# Patient Record
Sex: MALE | Race: BLACK OR AFRICAN AMERICAN | NOT HISPANIC OR LATINO | Employment: OTHER | URBAN - METROPOLITAN AREA
[De-identification: names, ages, dates, MRNs, and addresses within clinical notes are randomized per-mention and may not be internally consistent; named-entity substitution may affect disease eponyms.]

---

## 2022-09-07 RX ORDER — ATORVASTATIN CALCIUM 40 MG/1
40 TABLET, FILM COATED ORAL NIGHTLY
COMMUNITY
End: 2023-09-20 | Stop reason: ALTCHOICE

## 2022-09-07 RX ORDER — ISOSORBIDE DINITRATE 20 MG/1
20 TABLET ORAL 2 TIMES DAILY
COMMUNITY

## 2022-09-07 RX ORDER — GABAPENTIN 100 MG/1
200 CAPSULE ORAL 2 TIMES DAILY
COMMUNITY

## 2022-09-07 RX ORDER — AMLODIPINE BESYLATE 5 MG/1
5 TABLET ORAL DAILY
COMMUNITY
End: 2023-03-20 | Stop reason: ALTCHOICE

## 2022-09-07 RX ORDER — TAMSULOSIN HYDROCHLORIDE 0.4 MG/1
CAPSULE ORAL DAILY
COMMUNITY
End: 2022-09-19 | Stop reason: ALTCHOICE

## 2022-09-07 RX ORDER — OMEPRAZOLE 20 MG/1
20 CAPSULE, DELAYED RELEASE ORAL DAILY
COMMUNITY

## 2022-09-07 RX ORDER — CARVEDILOL 3.12 MG/1
3.12 TABLET ORAL 2 TIMES DAILY WITH MEALS
COMMUNITY

## 2022-09-07 RX ORDER — FINASTERIDE 5 MG/1
5 TABLET, FILM COATED ORAL DAILY
COMMUNITY

## 2022-09-07 RX ORDER — SERTRALINE HYDROCHLORIDE 50 MG/1
50 TABLET, FILM COATED ORAL NIGHTLY
COMMUNITY

## 2022-09-07 RX ORDER — FEBUXOSTAT 40 MG/1
40 TABLET, FILM COATED ORAL DAILY
COMMUNITY

## 2022-09-19 ENCOUNTER — OFFICE VISIT (OUTPATIENT)
Dept: NEPHROLOGY | Facility: CLINIC | Age: 77
End: 2022-09-19
Payer: MEDICARE

## 2022-09-19 VITALS
BODY MASS INDEX: 33.9 KG/M2 | DIASTOLIC BLOOD PRESSURE: 66 MMHG | WEIGHT: 216 LBS | HEART RATE: 58 BPM | HEIGHT: 67 IN | OXYGEN SATURATION: 96 % | SYSTOLIC BLOOD PRESSURE: 128 MMHG

## 2022-09-19 DIAGNOSIS — N18.9 ANEMIA IN CHRONIC KIDNEY DISEASE, UNSPECIFIED CKD STAGE: ICD-10-CM

## 2022-09-19 DIAGNOSIS — N18.31 TYPE 2 DIABETES MELLITUS WITH STAGE 3A CHRONIC KIDNEY DISEASE, WITHOUT LONG-TERM CURRENT USE OF INSULIN: ICD-10-CM

## 2022-09-19 DIAGNOSIS — D63.1 ANEMIA IN CHRONIC KIDNEY DISEASE, UNSPECIFIED CKD STAGE: ICD-10-CM

## 2022-09-19 DIAGNOSIS — E11.21 DIABETIC NEPHROPATHY ASSOCIATED WITH TYPE 2 DIABETES MELLITUS: ICD-10-CM

## 2022-09-19 DIAGNOSIS — E11.22 TYPE 2 DIABETES MELLITUS WITH STAGE 3A CHRONIC KIDNEY DISEASE, WITHOUT LONG-TERM CURRENT USE OF INSULIN: ICD-10-CM

## 2022-09-19 DIAGNOSIS — R80.9 NON-NEPHROTIC RANGE PROTEINURIA: ICD-10-CM

## 2022-09-19 DIAGNOSIS — I12.9 RENAL HYPERTENSION: ICD-10-CM

## 2022-09-19 DIAGNOSIS — M1A.0710 IDIOPATHIC CHRONIC GOUT OF RIGHT FOOT WITHOUT TOPHUS: ICD-10-CM

## 2022-09-19 DIAGNOSIS — N18.31 STAGE 3A CHRONIC KIDNEY DISEASE: Primary | ICD-10-CM

## 2022-09-19 PROBLEM — E11.29 TYPE 2 DIABETES MELLITUS WITH KIDNEY COMPLICATION, WITHOUT LONG-TERM CURRENT USE OF INSULIN: Status: ACTIVE | Noted: 2022-09-19

## 2022-09-19 PROCEDURE — 4010F PR ACE/ARB THEARPY RXD/TAKEN: ICD-10-PCS | Mod: CPTII,,, | Performed by: INTERNAL MEDICINE

## 2022-09-19 PROCEDURE — 99214 PR OFFICE/OUTPT VISIT, EST, LEVL IV, 30-39 MIN: ICD-10-PCS | Mod: ,,, | Performed by: INTERNAL MEDICINE

## 2022-09-19 PROCEDURE — 3074F PR MOST RECENT SYSTOLIC BLOOD PRESSURE < 130 MM HG: ICD-10-PCS | Mod: CPTII,,, | Performed by: INTERNAL MEDICINE

## 2022-09-19 PROCEDURE — 1159F MED LIST DOCD IN RCRD: CPT | Mod: CPTII,,, | Performed by: INTERNAL MEDICINE

## 2022-09-19 PROCEDURE — 3288F PR FALLS RISK ASSESSMENT DOCUMENTED: ICD-10-PCS | Mod: CPTII,,, | Performed by: INTERNAL MEDICINE

## 2022-09-19 PROCEDURE — 3066F NEPHROPATHY DOC TX: CPT | Mod: CPTII,,, | Performed by: INTERNAL MEDICINE

## 2022-09-19 PROCEDURE — 3074F SYST BP LT 130 MM HG: CPT | Mod: CPTII,,, | Performed by: INTERNAL MEDICINE

## 2022-09-19 PROCEDURE — 3066F PR DOCUMENTATION OF TREATMENT FOR NEPHROPATHY: ICD-10-PCS | Mod: CPTII,,, | Performed by: INTERNAL MEDICINE

## 2022-09-19 PROCEDURE — 1101F PT FALLS ASSESS-DOCD LE1/YR: CPT | Mod: CPTII,,, | Performed by: INTERNAL MEDICINE

## 2022-09-19 PROCEDURE — 3078F PR MOST RECENT DIASTOLIC BLOOD PRESSURE < 80 MM HG: ICD-10-PCS | Mod: CPTII,,, | Performed by: INTERNAL MEDICINE

## 2022-09-19 PROCEDURE — 1101F PR PT FALLS ASSESS DOC 0-1 FALLS W/OUT INJ PAST YR: ICD-10-PCS | Mod: CPTII,,, | Performed by: INTERNAL MEDICINE

## 2022-09-19 PROCEDURE — 1159F PR MEDICATION LIST DOCUMENTED IN MEDICAL RECORD: ICD-10-PCS | Mod: CPTII,,, | Performed by: INTERNAL MEDICINE

## 2022-09-19 PROCEDURE — 99214 OFFICE O/P EST MOD 30 MIN: CPT | Mod: ,,, | Performed by: INTERNAL MEDICINE

## 2022-09-19 PROCEDURE — 3078F DIAST BP <80 MM HG: CPT | Mod: CPTII,,, | Performed by: INTERNAL MEDICINE

## 2022-09-19 PROCEDURE — 3288F FALL RISK ASSESSMENT DOCD: CPT | Mod: CPTII,,, | Performed by: INTERNAL MEDICINE

## 2022-09-19 PROCEDURE — 4010F ACE/ARB THERAPY RXD/TAKEN: CPT | Mod: CPTII,,, | Performed by: INTERNAL MEDICINE

## 2022-09-19 RX ORDER — SEMAGLUTIDE 1.34 MG/ML
INJECTION, SOLUTION SUBCUTANEOUS
COMMUNITY

## 2022-09-19 RX ORDER — ACETAMINOPHEN 500 MG
TABLET ORAL 2 TIMES DAILY
COMMUNITY

## 2022-09-19 RX ORDER — ASPIRIN 81 MG/1
81 TABLET ORAL DAILY
COMMUNITY
Start: 2022-05-08

## 2022-09-19 NOTE — PATIENT INSTRUCTIONS
The patient has been provided with their current level of kidney function including eGFR and creatinine, and he is asked to return for follow-up, this time, in 6 months with repeat kidney lab work done before the visit.  .     We discussed the potential for common complications of CKD including anemia, electrolyte abnormalities, abnormal fluid balance, mineral bone disease and malnutrition.     We discussed strategies to slow the progression of their kidney disease including:  Avoid nephrotoxic agents. Avoid over-the-counter and prescription NSAIDs (Ibuprofren, Motrin, Naproxyn, Aleve, Mobic, Celebrex, Toradol, Advil). All of these can worsen kidney function, elevate BP, cause fluid retention/swelling and elevate potassium. Avoid iodine contrast agents and gadolinium, which can worsen kidney function and/or cause kidney failure. Avoid phosphosoda bowel preps which can worsen kidney function.  Work to improve modifiable risk factors. Aim for good control of blood glucose without episodes of hypoglycemia. Notify the provider managing your diabetes if your blood glucose < 60. Aim for good blood pressure control without episodes of hypotension. Call the office if your systolic blood pressure is consistently < 110. Aim for good control of your cholesterol.  AIC goal <7.0  BP goal <130/80  LDL chol goal <70        Keeping these in goal range will help prevent progression of cardiovascular disease and chronic kidney disease.     We discussed dietary modifications:  Low sodium diet: 2 gm/d or less  Limit/avoid high potassium foods  Avoid potassium containing salt substitutes  Limit/avoid high phosphorus foods  Limit daily protein intake to 0.8-1 gm/kg of your ideal body weight.     We discussed lifestyle modifications:  Make sure you are drinking plenty of fluids--64 ounces (1/2 gallon) daily  Exercise at least 30 minutes 5 x per week (total 150 minutes per week), example brisk walking  Achieve and maintain a healthy weight  (BMI 20-25)  Limit alcohol consumption to <2 drinks per day  Stop smoking  Make sure you stay current on important vaccines-- pneumonia vaccines (Pneumovax and Prevnar), flu vaccine, Hepatitis B (especially patients nearing renal replacement therapy and planning hemodialysis) and Covid-19 vaccine.      Recommendations:  Monitor your BP at home daily and record.  Bring readings to your next appt.  Call the office if your BP is persistently >130/80.  Seek urgent medical attention with signs and symptoms of uremia - extreme weakness, fatigue, confusion, anorexia, metallic taste in mouth, hiccoughs, cramping, itching, chest pain, swelling, or trouble sleeping.

## 2022-09-19 NOTE — PROGRESS NOTES
Pt Name:  Asif Gallardo  Pt :  1945  Pt MRN:  [id number]    Date: 2022    Reason for visit:     Follow up visit for Chronic Kidney Disease.    Serum creatinine 1.44, GFR 54, CKD stage 3a.    Chief Complaint:     The patient denies any complaints today.      Assessment & Plan:      Problem #1.  Type 2 Diabetes Mellitus     Assessment:    Controlled with A1c 7.2 %.   Plan:    Diabetes management and nutritional support as per Dr. Victor Manuel Browning.       Repeat A1c from time to time as indicated and again prior to the visit in 6 months.      Problem #2.  Stage II Essential Hypertension     Assessment:    Quality of control uncertain.  [facility]      Plan:    2 g sodium diet, amlodipine 5 mg by mouth daily, carvedilol 3.125 mg by mouth twice daily, isosorbide dinitrate 23 mg by mouth twice daily, valsartan 320 mg by mouth daily.      Problem #3.  Gout     Assessment:    Asymptomatic   Plan:    Febuxostat 40 mg by mouth daily.      Problem #4.  Chronic Kidney Disease Stage III     Assessment:    Stable and asymptomatic   Plan:    Conservative, non-dialysis, nephrologic management.     Empagliflozin 25 mg by mouth daily.     Return for follow-up in 6 months with repeat kidney lab work done before the visit.      Problem #5.  Anemia in Chronic Kidney Disease                  Assessment: Asymptomatic                              Plan: Repeat hemoglobin prior to the visit in 6 months.     Problem #6.  Persistent Proteinuria     Assessment:    Asymptomatic   Plan:    Empagliflozin 25 mg by mouth daily.    Repeat random urine protein/creatinine prior to the visit in 6 months.            HPI:      This is the [facility]th [facility] [place] visit, for this 76-year-old man referred by Dr. Victor Manuel Browning today for further evaluation of Chronic Kidney Disease Stage III with asymptomatic proteinuria.     In the office today for follow-up regarding the  status of his kidney function  he does not have azotemic symptoms.  Specifically, he does not have absent appetite, nausea or vomiting, chest pain, shortness of breath at rest  or with mild to moderate exertion,  swelling, absent energy, or difficulty with thought formulation or expression.           From the standpoint of the risk factors for the development of chronic kidney disease, he has had both Type 2 diabetes mellitus and Stage I essential hypertension for at least 20 years each.  He is followed by Dr. Bennett  for his eye problems and has recorded in Dr. Browning's notesa previous history of diabetic retinopathy. He is not aware that anyone in the family has had a kidney problem.       History:     Past Medical History:   Diagnosis Date    Chronic kidney disease, unspecified     Essential (primary) hypertension     Gout, unspecified     Sleep apnea, unspecified     Toxic liver disease with hepatitis, not elsewhere classified     Type 2 diabetes mellitus without complications     Vision abnormalities      Past Surgical History:   Procedure Laterality Date    APPENDECTOMY      COLON SURGERY      JOINT REPLACEMENT Right     Right Knee    LIVER SURGERY       Family History   Problem Relation Age of Onset    Hypertension Mother     Diabetes Mother     Cancer Mother     Diabetes Sister     Diabetes Brother      Social History     Substance and Sexual Activity   Alcohol Use Not Currently     Social History     Substance and Sexual Activity   Drug Use Not Currently     Social History     Substance and Sexual Activity   Sexual Activity Not Currently     reports that he is not currently sexually active.  Social History     Tobacco Use   Smoking Status Never   Smokeless Tobacco Never       Allergies:    Review of patient's allergies indicates:   Allergen Reactions    Metformin          Current Outpatient Medications:     acetaminophen (TYLENOL) 500 MG tablet, 2 (two) times a day., Disp: , Rfl:     amLODIPine (NORVASC)  5 MG tablet, Take 5 mg by mouth once daily., Disp: , Rfl:     aspirin (ECOTRIN) 81 MG EC tablet, Take 81 mg by mouth once daily., Disp: , Rfl:     atorvastatin (LIPITOR) 40 MG tablet, Take 40 mg by mouth every evening., Disp: , Rfl:     carvediloL (COREG) 3.125 MG tablet, Take 3.125 mg by mouth 2 (two) times daily with meals., Disp: , Rfl:     empagliflozin (JARDIANCE) 25 mg tablet, Take 25 mg by mouth once daily., Disp: , Rfl:     febuxostat (ULORIC) 40 mg Tab, Take 40 mg by mouth once daily., Disp: , Rfl:     finasteride (PROSCAR) 5 mg tablet, Take 5 mg by mouth once daily., Disp: , Rfl:     gabapentin (NEURONTIN) 100 MG capsule, Take 100 mg by mouth 2 (two) times a day., Disp: , Rfl:     isosorbide dinitrate (ISORDIL) 20 MG tablet, Take 20 mg by mouth 2 (two) times a day., Disp: , Rfl:     omeprazole (PRILOSEC) 20 MG capsule, Take 20 mg by mouth once daily., Disp: , Rfl:     semaglutide (OZEMPIC) 1 mg/dose (2 mg/1.5 mL) PnIj, Inject into the skin every 7 days., Disp: , Rfl:     sertraline (ZOLOFT) 50 MG tablet, Take 50 mg by mouth every evening., Disp: , Rfl:     tamsulosin (FLOMAX) 0.4 mg Cap, Take by mouth once daily., Disp: , Rfl:     vitamin D (VITAMIN D3) 1000 units Tab, Take 1,000 Units by mouth once daily., Disp: , Rfl:     ROS:     Constitutional:  Denies fever or chills   Eyes:  Denies change in visual acuity   HENT:  Denies nasal congestion or sore throat   Respiratory:  As in the history of the present illness.   Cardiovascular:  As in the history of the present illness.   GI:  As in the history of the present illness.    Musculoskeletal:  Denies back pain or joint pain   Integument:  Denies rash   Neurologic:  Denies headache, focal weakness or sensory changes   Endocrine:  Denies polyuria or polydipsia   Lymphatic:  Denies swollen glands   Psychiatric:  Denies depression or anxiety    Physical Exam:     Vitals:   Vitals:    09/19/22 1340   BP: 128/66   Pulse: (!) 58   SpO2: 96%   Weight: 98 kg (216  "lb)   Height: 5' 7" (1.702 m)     Body mass index is 33.83 kg/m².    Constitutional:  Well developed, well nourished, and in no acute distress   Eyes:  PERRLA, conjunctiva normal   HENT:  Atraumatic, external ears normal, nose normal.  Neck: There is no jugular venous distension or thyromegaly.   Respiratory:  No respiratory distress, normal breath sounds, no rales, no wheezing   Cardiovascular:  Normal rate,and a regular rhythm, no murmurs, no gallops, no rub, and no edema.  GI:  Normal bowel sounds.  Musculoskeletal:  No deformities.   Neurologic:  Alert & oriented x 3, CN 2-12 normal, normal motor function, and no asterixis.   Psychiatric:  Speech and behavior appropriate.    Labs/Tests:    Lab Results   Component Value Date     09/19/2022    K 4.0 09/19/2022     (H) 09/19/2022    CO2 29 09/19/2022    BUN 21 09/19/2022    CREATININE 1.44 (H) 09/19/2022    GLUCOSE 121 (H) 09/19/2022    CALCIUM 9.5 09/19/2022    ALBUMIN 3.8 09/19/2022    EGFRNONAA 47 (L) 09/19/2022    HGB 11.2 (L) 09/19/2022    HGBA1C 7.2 (H) 09/19/2022    TRIG 78 06/09/2020    CHOL 124 06/09/2020    HDL 47 06/09/2020    LDLCALC 61 06/09/2020    LABVLDL 16 06/09/2020       Date: 09/19/2022    Electrolytes (Na/K/Cl/CO2) = 144/4.0/109/29   BUN/Creat/GFR = 21/1.44/54  Ca/Phos/Alb/PTH = 9.5/ 3.4/3.8/70  U Prot/creat =   Glucose/A1c = 121/7.2   Hgb = 11.2      Follow Up:     Return follow-up in 6 months with repeat kidney lab work done before the visit.      This note was created using the voice recognition software currently available to the Medical Staff of the [facility] and its health care facilities. All of the best efforts undertaken to edit the product of that use shall necessarily fall short from time to time. Viewers and reviewers of the product of its use are encouraged to contact this provider for clarification when, and if, the product message is unclear.      "

## 2023-03-20 ENCOUNTER — OFFICE VISIT (OUTPATIENT)
Dept: NEPHROLOGY | Facility: CLINIC | Age: 78
End: 2023-03-20
Payer: MEDICARE

## 2023-03-20 VITALS
HEART RATE: 66 BPM | BODY MASS INDEX: 33.27 KG/M2 | SYSTOLIC BLOOD PRESSURE: 144 MMHG | HEIGHT: 67 IN | DIASTOLIC BLOOD PRESSURE: 64 MMHG | OXYGEN SATURATION: 97 % | WEIGHT: 212 LBS

## 2023-03-20 DIAGNOSIS — N18.32 STAGE 3B CHRONIC KIDNEY DISEASE: Primary | ICD-10-CM

## 2023-03-20 DIAGNOSIS — M1A.0710 IDIOPATHIC CHRONIC GOUT OF RIGHT FOOT WITHOUT TOPHUS: ICD-10-CM

## 2023-03-20 DIAGNOSIS — I12.9 RENAL HYPERTENSION: ICD-10-CM

## 2023-03-20 DIAGNOSIS — D63.1 ANEMIA IN STAGE 3B CHRONIC KIDNEY DISEASE: ICD-10-CM

## 2023-03-20 DIAGNOSIS — N25.81 SECONDARY HYPERPARATHYROIDISM OF RENAL ORIGIN: ICD-10-CM

## 2023-03-20 DIAGNOSIS — E11.22 TYPE 2 DIABETES MELLITUS WITH STAGE 3B CHRONIC KIDNEY DISEASE, WITHOUT LONG-TERM CURRENT USE OF INSULIN: ICD-10-CM

## 2023-03-20 DIAGNOSIS — N18.32 TYPE 2 DIABETES MELLITUS WITH STAGE 3B CHRONIC KIDNEY DISEASE, WITHOUT LONG-TERM CURRENT USE OF INSULIN: ICD-10-CM

## 2023-03-20 DIAGNOSIS — R80.9 NON-NEPHROTIC RANGE PROTEINURIA: ICD-10-CM

## 2023-03-20 DIAGNOSIS — E11.21 DIABETIC NEPHROPATHY ASSOCIATED WITH TYPE 2 DIABETES MELLITUS: ICD-10-CM

## 2023-03-20 DIAGNOSIS — N18.32 ANEMIA IN STAGE 3B CHRONIC KIDNEY DISEASE: ICD-10-CM

## 2023-03-20 PROCEDURE — 99214 OFFICE O/P EST MOD 30 MIN: CPT | Mod: S$GLB,,, | Performed by: INTERNAL MEDICINE

## 2023-03-20 PROCEDURE — 99214 PR OFFICE/OUTPT VISIT, EST, LEVL IV, 30-39 MIN: ICD-10-PCS | Mod: S$GLB,,, | Performed by: INTERNAL MEDICINE

## 2023-03-20 RX ORDER — TAMSULOSIN HYDROCHLORIDE 0.4 MG/1
1 CAPSULE ORAL DAILY
COMMUNITY
Start: 2022-05-26 | End: 2023-05-27

## 2023-03-20 RX ORDER — AMLODIPINE AND OLMESARTAN MEDOXOMIL 5; 40 MG/1; MG/1
1 TABLET ORAL DAILY
COMMUNITY
End: 2023-09-20 | Stop reason: ALTCHOICE

## 2023-03-20 RX ORDER — LANOLIN ALCOHOL/MO/W.PET/CERES
100 CREAM (GRAM) TOPICAL DAILY
COMMUNITY

## 2023-03-20 NOTE — PATIENT INSTRUCTIONS
The patient has been provided with a current level of kidney function today and a discussion regarding the need to increase his fluid intake to avoid development of dehydration and a decrease in kidney function as a consequence..    What the decrease in kidney function seen today may be due to is not immediately obvious but could relate to the addition of the ARB since last visit.

## 2023-03-20 NOTE — PROGRESS NOTES
Pt Name:  Asif Gallardo  Pt :  1945  Pt MRN:  [id number]    Date: 3/20/2023    Reason for visit:     Follow up visit for Chronic Kidney Disease.    Serum creatinine 1.65, GFR 43, CKD stage 3b.    Chief Complaint:     The patient denies any complaints today.      Assessment & Plan:      Problem #1.  Type 2 Diabetes Mellitus     Assessment:    Controlled with A1c 7.2 %.   Plan:    Diabetes management and nutritional support as per Dr. Victor Manuel Browning.    Empagliflozin 25 mg by mouth daily.       Repeat A1c from time to time as indicated and again prior to the visit in 6 months.      Problem #2.  Stage II Essential Hypertension     Assessment:    Reasonably controlled.      Plan:    2 g sodium diet, amlodipine 5 mg by mouth daily, carvedilol 3.125 mg by mouth twice daily, isosorbide dinitrate 23 mg by mouth twice daily, valsartan 320 mg by mouth daily.      Problem #3.  Gout     Assessment:    Asymptomatic   Plan:    Febuxostat 40 mg by mouth daily.      Problem #4.  Chronic Kidney Disease Stage III     Assessment:    Stable and asymptomatic   Plan:    Conservative, non-dialysis, nephrologic management.     Empagliflozin 25 mg by mouth daily.     Return for follow-up in 6 months with repeat kidney lab work done before the visit.      Problem #5.  Anemia in Chronic Kidney Disease                  Assessment: Asymptomatic                              Plan: Repeat hemoglobin prior to the visit in 6 months.     Problem #6.  Persistent Proteinuria     Assessment:    Asymptomatic   Plan:    Empagliflozin 25 mg by mouth daily.    Repeat random urine protein/creatinine prior to the visit in 6 months.            HPI:      This is the 11th [facility] [place] visit, for this 77-year-old man referred by Dr. Victor Manuel Browning today for further evaluation of Chronic Kidney Disease Stage III with asymptomatic proteinuria.     At presentation to the office today for follow-up regarding the status of his kidney  function  he does not have  absent appetite, nausea or vomiting, chest pain, shortness of breath at rest  or with mild to moderate exertion,  shortness of breath awakening him from sleep at night, swelling, absent energy, or difficulty with thought formulation or expression.           From the standpoint of the risk factors for the development of chronic kidney disease, he has had both Type 2 diabetes mellitus and Stage I essential hypertension for at least 20 years each.  He is followed by Dr. Bennett  for his eye problems and has recorded in Dr. Browning's notesa previous history of diabetic retinopathy. He is not aware that anyone in the family has had a kidney problem.       History:     Past Medical History:   Diagnosis Date    Chronic kidney disease, unspecified     Essential (primary) hypertension     Gout, unspecified     Sleep apnea, unspecified     Toxic liver disease with hepatitis, not elsewhere classified     Type 2 diabetes mellitus without complications     Vision abnormalities      Past Surgical History:   Procedure Laterality Date    APPENDECTOMY      COLON SURGERY      JOINT REPLACEMENT Right     Right Knee    LIVER SURGERY       Family History   Problem Relation Age of Onset    Hypertension Mother     Diabetes Mother     Cancer Mother     Diabetes Sister     Diabetes Brother      Social History     Substance and Sexual Activity   Alcohol Use Not Currently     Social History     Substance and Sexual Activity   Drug Use Yes    Types: Codeine     Social History     Substance and Sexual Activity   Sexual Activity Not Currently     reports that he is not currently sexually active.  Social History     Tobacco Use   Smoking Status Never   Smokeless Tobacco Never       Allergies:    Review of patient's allergies indicates:   Allergen Reactions    Hydroxyzine      Other reaction(s): Nightmares    Lisinopril     Metformin          Current Outpatient Medications:     acetaminophen (TYLENOL) 500 MG tablet, 2  (two) times a day., Disp: , Rfl:     amlodipine-olmesartan (ANANT) 5-40 mg per tablet, Take 1 tablet by mouth once daily., Disp: , Rfl:     aspirin (ECOTRIN) 81 MG EC tablet, Take 81 mg by mouth once daily., Disp: , Rfl:     atorvastatin (LIPITOR) 40 MG tablet, Take 40 mg by mouth every evening., Disp: , Rfl:     carvediloL (COREG) 3.125 MG tablet, Take 3.125 mg by mouth 2 (two) times daily with meals., Disp: , Rfl:     cholecalciferol, vitamin D3, 100 mcg (4,000 unit) Cap capsule, Take 4,000 Units by mouth once daily., Disp: , Rfl:     cyanocobalamin (VITAMIN B-12) 1000 MCG tablet, Take 100 mcg by mouth once daily., Disp: , Rfl:     empagliflozin (JARDIANCE) 25 mg tablet, Take 25 mg by mouth once daily., Disp: , Rfl:     febuxostat (ULORIC) 40 mg Tab, Take 40 mg by mouth once daily., Disp: , Rfl:     finasteride (PROSCAR) 5 mg tablet, Take 5 mg by mouth once daily., Disp: , Rfl:     gabapentin (NEURONTIN) 100 MG capsule, Take 100 mg by mouth 2 (two) times a day., Disp: , Rfl:     isosorbide dinitrate (ISORDIL) 20 MG tablet, Take 20 mg by mouth 2 (two) times a day., Disp: , Rfl:     omeprazole (PRILOSEC) 20 MG capsule, Take 20 mg by mouth once daily., Disp: , Rfl:     semaglutide (OZEMPIC) 1 mg/dose (2 mg/1.5 mL) PnIj, Inject into the skin every 7 days., Disp: , Rfl:     sertraline (ZOLOFT) 50 MG tablet, Take 50 mg by mouth every evening., Disp: , Rfl:     tamsulosin (FLOMAX) 0.4 mg Cap, Take 1 capsule by mouth once daily., Disp: , Rfl:     amLODIPine (NORVASC) 5 MG tablet, Take 5 mg by mouth once daily., Disp: , Rfl:     ROS:     Constitutional:  Denies fever or chills   Eyes:  Denies change in visual acuity   HENT:  Denies nasal congestion or sore throat   Respiratory:  As in the history of the present illness.   Cardiovascular:  As in the history of the present illness.   GI:  As in the history of the present illness.    Musculoskeletal:  Denies back pain or joint pain   Integument:  Denies rash   Neurologic:   "Denies headache, focal weakness or sensory changes   Endocrine:  Denies polyuria or polydipsia   Lymphatic:  Denies swollen glands   Psychiatric:  Denies depression or anxiety    Physical Exam:     Vitals:   Vitals:    03/20/23 1338   BP: (!) 144/64   Pulse: 66   SpO2: 97%   Weight: 96.2 kg (212 lb)   Height: 5' 7" (1.702 m)   PainSc: 0-No pain     Body mass index is 33.2 kg/m².    Constitutional:  Well developed, well nourished, and in no acute distress   Eyes:  PERRLA, conjunctiva normal   HENT:  Atraumatic, external ears normal, nose normal.  Neck: There is no jugular venous distension or thyromegaly.   Respiratory:  No respiratory distress, normal breath sounds, no rales, no wheezing   Cardiovascular:  Normal rate,and a regular rhythm, no murmurs, no gallops, no rub, and no edema.  GI:  Normal bowel sounds.  Musculoskeletal:  No deformities.   Neurologic:  Alert & oriented x 3, CN 2-12 normal, normal motor function, and no asterixis.   Psychiatric:  Speech and behavior appropriate.      Labs/Tests:    Date: 03/20/2023    Na/K/Cl/CO2 = 144/4.4/107/31   BUN/Creat/GFR = 29/1.65/43  Ca/Phos/Alb/PTH = 9.8/ 3.7/4.1/124  U Prot/creat = 1.2  Glucose/A1c = 130/7.0   Hgb = 11.6      Follow Up:     Return follow-up in 6 months with repeat kidney lab work done before the visit.      This note was created using the voice recognition software currently available to the Medical Staff of the [facility] and its health care facilities. All of the best efforts undertaken to edit the product of that use shall necessarily fall short from time to time. Viewers and reviewers of the product of its use are encouraged to contact this provider for clarification when, and if, the product message is unclear.        "

## 2023-09-20 ENCOUNTER — OFFICE VISIT (OUTPATIENT)
Dept: NEPHROLOGY | Facility: CLINIC | Age: 78
End: 2023-09-20
Payer: MEDICARE

## 2023-09-20 VITALS
DIASTOLIC BLOOD PRESSURE: 54 MMHG | HEIGHT: 67 IN | WEIGHT: 216 LBS | HEART RATE: 97 BPM | OXYGEN SATURATION: 97 % | BODY MASS INDEX: 33.9 KG/M2 | SYSTOLIC BLOOD PRESSURE: 117 MMHG

## 2023-09-20 DIAGNOSIS — I63.81 CEREBROVASCULAR ACCIDENT (CVA) DUE TO OCCLUSION OF SMALL ARTERY: ICD-10-CM

## 2023-09-20 DIAGNOSIS — I12.9 RENAL HYPERTENSION: ICD-10-CM

## 2023-09-20 DIAGNOSIS — N18.32 ANEMIA IN STAGE 3B CHRONIC KIDNEY DISEASE: ICD-10-CM

## 2023-09-20 DIAGNOSIS — E11.21 DIABETIC NEPHROPATHY ASSOCIATED WITH TYPE 2 DIABETES MELLITUS: ICD-10-CM

## 2023-09-20 DIAGNOSIS — D63.1 ANEMIA IN STAGE 3B CHRONIC KIDNEY DISEASE: ICD-10-CM

## 2023-09-20 DIAGNOSIS — N18.32 STAGE 3B CHRONIC KIDNEY DISEASE: Primary | ICD-10-CM

## 2023-09-20 DIAGNOSIS — N25.81 SECONDARY HYPERPARATHYROIDISM OF RENAL ORIGIN: ICD-10-CM

## 2023-09-20 DIAGNOSIS — M1A.0710 IDIOPATHIC CHRONIC GOUT OF RIGHT FOOT WITHOUT TOPHUS: ICD-10-CM

## 2023-09-20 DIAGNOSIS — R80.9 NON-NEPHROTIC RANGE PROTEINURIA: ICD-10-CM

## 2023-09-20 DIAGNOSIS — E11.22 TYPE 2 DIABETES MELLITUS WITH STAGE 3B CHRONIC KIDNEY DISEASE, WITHOUT LONG-TERM CURRENT USE OF INSULIN: ICD-10-CM

## 2023-09-20 DIAGNOSIS — N18.32 TYPE 2 DIABETES MELLITUS WITH STAGE 3B CHRONIC KIDNEY DISEASE, WITHOUT LONG-TERM CURRENT USE OF INSULIN: ICD-10-CM

## 2023-09-20 PROBLEM — I63.9 STROKE: Status: ACTIVE | Noted: 2023-09-20

## 2023-09-20 PROCEDURE — 3078F PR MOST RECENT DIASTOLIC BLOOD PRESSURE < 80 MM HG: ICD-10-PCS | Mod: CPTII,S$GLB,, | Performed by: INTERNAL MEDICINE

## 2023-09-20 PROCEDURE — 99214 OFFICE O/P EST MOD 30 MIN: CPT | Mod: S$GLB,,, | Performed by: INTERNAL MEDICINE

## 2023-09-20 PROCEDURE — 3288F PR FALLS RISK ASSESSMENT DOCUMENTED: ICD-10-PCS | Mod: CPTII,S$GLB,, | Performed by: INTERNAL MEDICINE

## 2023-09-20 PROCEDURE — 1101F PR PT FALLS ASSESS DOC 0-1 FALLS W/OUT INJ PAST YR: ICD-10-PCS | Mod: CPTII,S$GLB,, | Performed by: INTERNAL MEDICINE

## 2023-09-20 PROCEDURE — 3074F SYST BP LT 130 MM HG: CPT | Mod: CPTII,S$GLB,, | Performed by: INTERNAL MEDICINE

## 2023-09-20 PROCEDURE — 99214 PR OFFICE/OUTPT VISIT, EST, LEVL IV, 30-39 MIN: ICD-10-PCS | Mod: S$GLB,,, | Performed by: INTERNAL MEDICINE

## 2023-09-20 PROCEDURE — 1159F MED LIST DOCD IN RCRD: CPT | Mod: CPTII,S$GLB,, | Performed by: INTERNAL MEDICINE

## 2023-09-20 PROCEDURE — 3288F FALL RISK ASSESSMENT DOCD: CPT | Mod: CPTII,S$GLB,, | Performed by: INTERNAL MEDICINE

## 2023-09-20 PROCEDURE — 1159F PR MEDICATION LIST DOCUMENTED IN MEDICAL RECORD: ICD-10-PCS | Mod: CPTII,S$GLB,, | Performed by: INTERNAL MEDICINE

## 2023-09-20 PROCEDURE — 1101F PT FALLS ASSESS-DOCD LE1/YR: CPT | Mod: CPTII,S$GLB,, | Performed by: INTERNAL MEDICINE

## 2023-09-20 PROCEDURE — 3074F PR MOST RECENT SYSTOLIC BLOOD PRESSURE < 130 MM HG: ICD-10-PCS | Mod: CPTII,S$GLB,, | Performed by: INTERNAL MEDICINE

## 2023-09-20 PROCEDURE — 3078F DIAST BP <80 MM HG: CPT | Mod: CPTII,S$GLB,, | Performed by: INTERNAL MEDICINE

## 2023-09-20 RX ORDER — TAMSULOSIN HYDROCHLORIDE 0.4 MG/1
1 CAPSULE ORAL DAILY
COMMUNITY
Start: 2023-06-13 | End: 2024-06-13

## 2023-09-20 RX ORDER — AMLODIPINE BESYLATE 10 MG/1
5 TABLET ORAL DAILY
COMMUNITY

## 2023-09-20 RX ORDER — CLOPIDOGREL BISULFATE 75 MG/1
75 TABLET ORAL DAILY
Qty: 30 TABLET | Refills: 0 | Status: SHIPPED | OUTPATIENT
Start: 2023-09-20 | End: 2024-09-19

## 2023-09-20 RX ORDER — CLOPIDOGREL BISULFATE 75 MG/1
75 TABLET ORAL DAILY
Qty: 30 TABLET | Refills: 0 | Status: SHIPPED | OUTPATIENT
Start: 2023-09-20 | End: 2023-09-20

## 2023-09-20 NOTE — PROGRESS NOTES
Pt Name:  Asif Gallardo  Pt :  1945  Pt MRN:  [id number]    Date: 2023    Reason for visit:     Follow up visit for Chronic Kidney Disease.    Serum creatinine 1.78, GFR 39, CKD stage 3b.    Chief Complaint:     The patient denies any complaints today.      Assessment & Plan:      Problem #1.  Type 2 Diabetes Mellitus     Assessment:    Controlled with A1c 7.4 %.   Plan:    Diabetes management and nutritional support as per Dr. Victor Manuel Browning.    Empagliflozin 25 mg by mouth daily.       Repeat A1c from time to time as indicated and again prior to the visit in 6 months.      Problem #2.  Stage II Essential Hypertension     Assessment:    Reasonably controlled.      Plan:    2 g sodium diet, amlodipine 5 mg by mouth daily, carvedilol 3.125 mg by mouth twice daily, isosorbide dinitrate 23 mg by mouth twice daily, valsartan 320 mg by mouth daily.      Problem #3.  Gout     Assessment:    Asymptomatic   Plan:    Febuxostat 40 mg by mouth daily.      Problem #4.  Chronic Kidney Disease Stage III     Assessment:    Stable and asymptomatic   Plan:    Conservative, non-dialysis, nephrologic management.     Empagliflozin 25 mg by mouth daily.     Return for follow-up in 6 months with repeat kidney lab work done before the visit.      Problem #5.  Anemia in Chronic Kidney Disease                  Assessment: Asymptomatic                              Plan: Repeat hemoglobin prior to the visit in 6 months.     Problem #6.  Persistent Proteinuria     Assessment:    Asymptomatic   Plan:    Empagliflozin 25 mg by mouth daily.    Repeat random urine protein/creatinine prior to the visit in 6 months.       Problem #7.  Recent Stroke     Assessment:    Asymptomatic   Plan:    New prescription for clopidogrel 75 mg by mouth daily is sent to his drugstore and he is asked to see Dr. Davison as soon as possible to ask the question or not he is to continue on the clopidogrel or is to take 325 mg of aspirin a day for stroke  prevention.          HPI:      This is the [facility]th [facility] visit, for this 78-year-old man referred by Dr. Victor Manuel Browning today for further evaluation of Chronic Kidney Disease Stage III with asymptomatic proteinuria.     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  He does not know whether he had an abnormal heartbeat at the time that he was evaluated there.    In the office today for follow-up regarding the status of his kidney function  he does not have azotemic symptoms. Specifically, he does not have absent appetite, nausea or vomiting, chest pain, shortness of breath at rest  or with mild to moderate exertion,  shortness of breath awakening him from sleep at night, swelling, absent energy, or difficulty with thought formulation or expression.           From the standpoint of the risk factors for the development of chronic kidney disease, he has had both Type 2 diabetes mellitus and Stage I essential hypertension for at least 20 years each.  He is followed by Dr. Bennett  for his eye problems and has recorded in Dr. Browning's notesa previous history of diabetic retinopathy. He is not aware that anyone in the family has had a kidney problem.       History:     Past Medical History:   Diagnosis Date    Chronic kidney disease, unspecified     Essential (primary) hypertension     Gout, unspecified     Sleep apnea, unspecified     Toxic liver disease with hepatitis, not elsewhere classified     Type 2 diabetes mellitus without complications     Vision abnormalities      Past Surgical History:   Procedure Laterality Date    APPENDECTOMY      COLON SURGERY      JOINT REPLACEMENT Right     Right Knee    LIVER SURGERY       Family History   Problem Relation Age of Onset    Hypertension Mother     Diabetes  Mother     Cancer Mother     Diabetes Sister     Diabetes Brother      Social History     Substance and Sexual Activity   Alcohol Use Not Currently     Social History     Substance and Sexual Activity   Drug Use Not Currently     Social History     Substance and Sexual Activity   Sexual Activity Not Currently     reports that he is not currently sexually active.  Social History     Tobacco Use   Smoking Status Never   Smokeless Tobacco Never       Allergies:    Review of patient's allergies indicates:   Allergen Reactions    Hydroxyzine      Other reaction(s): Nightmares    Lisinopril     Metformin          Current Outpatient Medications:     acetaminophen (TYLENOL) 500 MG tablet, 2 (two) times a day., Disp: , Rfl:     amLODIPine (NORVASC) 10 MG tablet, Take 5 mg by mouth once daily., Disp: , Rfl:     aspirin (ECOTRIN) 81 MG EC tablet, Take 81 mg by mouth once daily., Disp: , Rfl:     carvediloL (COREG) 3.125 MG tablet, Take 3.125 mg by mouth 2 (two) times daily with meals., Disp: , Rfl:     cholecalciferol, vitamin D3, 100 mcg (4,000 unit) Cap capsule, Take 4,000 Units by mouth once daily., Disp: , Rfl:     cyanocobalamin (VITAMIN B-12) 1000 MCG tablet, Take 100 mcg by mouth once daily., Disp: , Rfl:     empagliflozin (JARDIANCE) 25 mg tablet, Take 25 mg by mouth once daily., Disp: , Rfl:     febuxostat (ULORIC) 40 mg Tab, Take 40 mg by mouth once daily., Disp: , Rfl:     finasteride (PROSCAR) 5 mg tablet, Take 5 mg by mouth once daily., Disp: , Rfl:     gabapentin (NEURONTIN) 100 MG capsule, Take 200 mg by mouth 2 (two) times a day., Disp: , Rfl:     isosorbide dinitrate (ISORDIL) 20 MG tablet, Take 20 mg by mouth 2 (two) times a day., Disp: , Rfl:     omeprazole (PRILOSEC) 20 MG capsule, Take 20 mg by mouth once daily., Disp: , Rfl:     semaglutide (OZEMPIC) 1 mg/dose (2 mg/1.5 mL) PnIj, Inject into the skin every 7 days., Disp: , Rfl:     sertraline (ZOLOFT) 50 MG tablet, Take 50 mg by mouth every evening.,  "Disp: , Rfl:     tamsulosin (FLOMAX) 0.4 mg Cap, Take 1 capsule by mouth once daily., Disp: , Rfl:     amlodipine-olmesartan (ANANT) 5-40 mg per tablet, Take 1 tablet by mouth once daily., Disp: , Rfl:     atorvastatin (LIPITOR) 40 MG tablet, Take 40 mg by mouth every evening., Disp: , Rfl:     ROS:     Constitutional:  Denies fever or chills   Eyes:  Denies change in visual acuity   HENT:  Denies nasal congestion or sore throat   Respiratory:  As in the history of the present illness.   Cardiovascular:  As in the history of the present illness.   GI:  As in the history of the present illness.    Musculoskeletal:  Denies back pain or joint pain   Integument:  Denies rash   Neurologic:  Denies headache, focal weakness or sensory changes   Endocrine:  Denies polyuria or polydipsia   Lymphatic:  Denies swollen glands   Psychiatric:  Denies depression or anxiety    Physical Exam:     Vitals:   Vitals:    09/20/23 1358   BP: (!) 117/54   Pulse: 97   SpO2: 97%   Weight: 98 kg (216 lb)   Height: 5' 7" (1.702 m)     Body mass index is 33.83 kg/m².    Constitutional:  Well developed, well nourished, and in no acute distress   Eyes:  PERRLA, conjunctiva normal   HENT:  Atraumatic, external ears normal, nose normal.  Neck: There is no jugular venous distension or thyromegaly.   Respiratory:  No respiratory distress, normal breath sounds, no rales, no wheezing   Cardiovascular:  Normal rate,and a regular rhythm, no murmurs, no gallops, no rub, and no edema.  GI:  Normal bowel sounds.  Musculoskeletal:  No deformities.   Neurologic:  Alert & oriented x 3, CN 2-12 normal, normal motor function, and no asterixis.   Psychiatric:  Speech and behavior appropriate.      Labs/Tests:    Date: 09/15/2023    Na/K/Cl/CO2 = 141/3.8/106/30   BUN/Creat/GFR = 29/1.78/39  Ca/Phos/Alb/PTH = 9.8/ 3.4/4.0/126  U Prot/creat = 0.8  Glucose/A1c = 216/7.4   Hgb = 11.6      Follow Up:     Return follow-up in 6 months with repeat kidney lab work done " before the visit.      This note was created using the voice recognition software currently available to the Medical Staff of the [facility] and its health care facilities. All of the best efforts undertaken to edit the product of that use shall necessarily fall short from time to time. Viewers and reviewers of the product of its use are encouraged to contact this provider for clarification when, and if, the product message is unclear.

## 2023-09-20 NOTE — PATIENT INSTRUCTIONS
The patient is provided with his current level of somewhat decreased kidney function and he is asked to give more attention to take in at least 72 oz of fluid a day to avoid development of dehydration and a decrease in kidney function as a consequence.    He is provided with a 30 day prescription for clopidogrel to last him until he can see his primary care provider, Dr. Vasquez in [place], and have a determination made whether he is to continue on the clopidogrel or to take 325 mg of aspirin a day for stroke prevention.    In addition, he is asked to return here in 6 months with repeat kidney lab work done before the visit.

## 2023-12-25 PROBLEM — I63.9 STROKE: Status: RESOLVED | Noted: 2023-09-20 | Resolved: 2023-12-25
